# Patient Record
Sex: MALE | Race: WHITE | Employment: FULL TIME | URBAN - METROPOLITAN AREA
[De-identification: names, ages, dates, MRNs, and addresses within clinical notes are randomized per-mention and may not be internally consistent; named-entity substitution may affect disease eponyms.]

---
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
## 2018-09-03 ENCOUNTER — APPOINTMENT (OUTPATIENT)
Dept: GENERAL RADIOLOGY | Age: 18
End: 2018-09-03

## 2018-09-03 ENCOUNTER — HOSPITAL ENCOUNTER (EMERGENCY)
Age: 18
Discharge: HOME | End: 2018-09-03
Attending: EMERGENCY MEDICINE

## 2018-09-03 VITALS
HEART RATE: 60 BPM | BODY MASS INDEX: 23.1 KG/M2 | TEMPERATURE: 98.4 F | RESPIRATION RATE: 16 BRPM | HEIGHT: 71 IN | OXYGEN SATURATION: 100 % | DIASTOLIC BLOOD PRESSURE: 61 MMHG | WEIGHT: 165 LBS | SYSTOLIC BLOOD PRESSURE: 142 MMHG

## 2018-09-03 DIAGNOSIS — S62.315A DISPLACED FRACTURE OF BASE OF FOURTH METACARPAL BONE, LEFT HAND, INITIAL ENCOUNTER FOR CLOSED FRACTURE: ICD-10-CM

## 2018-09-03 DIAGNOSIS — S62.317A DISPLACED FRACTURE OF BASE OF FIFTH METACARPAL BONE, LEFT HAND, INITIAL ENCOUNTER FOR CLOSED FRACTURE: Primary | ICD-10-CM

## 2018-09-03 PROCEDURE — 4500000023 HC ED LEVEL 3 PROCEDURE

## 2018-09-03 PROCEDURE — 6370000000 HC RX 637 (ALT 250 FOR IP): Performed by: EMERGENCY MEDICINE

## 2018-09-03 PROCEDURE — 6360000002 HC RX W HCPCS: Performed by: EMERGENCY MEDICINE

## 2018-09-03 PROCEDURE — 99283 EMERGENCY DEPT VISIT LOW MDM: CPT

## 2018-09-03 PROCEDURE — 73120 X-RAY EXAM OF HAND: CPT

## 2018-09-03 RX ORDER — HYDROCODONE BITARTRATE AND ACETAMINOPHEN 5; 325 MG/1; MG/1
2 TABLET ORAL ONCE
Status: COMPLETED | OUTPATIENT
Start: 2018-09-03 | End: 2018-09-03

## 2018-09-03 RX ORDER — ONDANSETRON 4 MG/1
4 TABLET, ORALLY DISINTEGRATING ORAL ONCE
Status: COMPLETED | OUTPATIENT
Start: 2018-09-03 | End: 2018-09-03

## 2018-09-03 RX ADMIN — HYDROCODONE BITARTRATE AND ACETAMINOPHEN 2 TABLET: 5; 325 TABLET ORAL at 22:00

## 2018-09-03 RX ADMIN — ONDANSETRON 4 MG: 4 TABLET, ORALLY DISINTEGRATING ORAL at 22:00

## 2018-09-03 ASSESSMENT — PAIN DESCRIPTION - ORIENTATION: ORIENTATION: LEFT

## 2018-09-03 ASSESSMENT — PAIN DESCRIPTION - DESCRIPTORS: DESCRIPTORS: ACHING

## 2018-09-03 ASSESSMENT — PAIN DESCRIPTION - PAIN TYPE: TYPE: ACUTE PAIN

## 2018-09-03 ASSESSMENT — PAIN SCALES - GENERAL
PAINLEVEL_OUTOF10: 2
PAINLEVEL_OUTOF10: 2

## 2018-09-03 ASSESSMENT — PAIN DESCRIPTION - LOCATION: LOCATION: HAND
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
## 2018-09-11 ENCOUNTER — CLINICAL DOCUMENTATION (OUTPATIENT)
Dept: ORTHOPEDIC SURGERY | Age: 18
End: 2018-09-11

## 2018-09-13 ENCOUNTER — OFFICE VISIT (OUTPATIENT)
Dept: ORTHOPEDIC SURGERY | Age: 18
End: 2018-09-13

## 2018-09-13 VITALS — BODY MASS INDEX: 23.99 KG/M2 | WEIGHT: 162 LBS | HEIGHT: 69 IN

## 2018-09-13 DIAGNOSIS — S62.337A CLOSED DISPLACED FRACTURE OF NECK OF FIFTH METACARPAL BONE OF LEFT HAND, INITIAL ENCOUNTER: Primary | ICD-10-CM

## 2018-09-13 PROCEDURE — L3807 WHFO W/O JOINTS PRE CST: HCPCS | Performed by: ORTHOPAEDIC SURGERY

## 2018-09-13 PROCEDURE — 99203 OFFICE O/P NEW LOW 30 MIN: CPT | Performed by: ORTHOPAEDIC SURGERY

## 2018-09-13 NOTE — PROGRESS NOTES
intact. Palpation: He has mild tenderness to palpation over the 5th metacarpal, no tenderness to palpation over the 4th metacarpal, negative Tenderness at radial styloid, scaphoid, ulnar styloid, DRUJ    Range of Motion:  He is able to make a complete fist, lacks a few degrees of extension at his small finger. He has 4+ out of 5 strength with the left hand and no pain. Her Gait is normal there is no evidence of scissoring. Skin: There are no rashes, ulcerations or lesions. Special Tests:   Deferred    Reflex: normal    Additional Examinations:  Right Lower Extremity: Examination of the right lower extremity does not show any tenderness, deformity or injury. Range of motion is unremarkable. There is no gross instability. There are no rashes, ulcerations or lesions. Strength and tone are normal.    Cervical spine: The skin is warm and dry. There is no swelling, warmth, or erythema. Range of motion is within normal limits. There is no paraspinal or spinous process tenderness. Spurling's sign is negative and did not produce shoulder pain. The distal neurovascular exam is grossly intact. Additional Comments: Sensation is intact to light touch throughout the median, ulnar and radial nerve distribution. Able to wiggle fingers, give thumbs up, A-OK and cross index and middle fingers. The patient has warm and well-perfused bilateral upper extremities with brisk capillary refill. Minors test is normal    X-RAYS:  3 views of the left hand are reviewed. There is no periosteal reaction, medullary lesions, or osteopenia. Joint spaces are well maintained. He has a vorally angulated 5th metacarpal neck fracture, well-healed 4th metacarpal base fracture no evidence of soft tissue swelling. Assessment:  5th metacarpal neck fracture    Impression:  Encounter Diagnosis   Name Primary?     Closed displaced fracture of neck of fifth metacarpal bone of left hand, initial encounter Yes         Treatment Plan: We discussed treatment options for his 5th metacarpal neck fracture, he does have volar angulation fell any malunion or nonunion. We discussed both surgical and conservative treatment options. Patient states he does not wish to pursue surgery at this time, he will proceed with conservative treatment. Follow up in 1 month for repeat x-ray of the left hand. We placed him in an ulnar gutter boxer's fracture exos splint. He is to wear this at all times aside from bathing. He can return to work in the splint. Office Procedures:  Orders Placed This Encounter   Procedures    Exos Boxers Fracture Brace     Patient was prescribed a Exos Boxer Fracture Brace. The left WRIST will require stabilization / immobilization from this semi-rigid / rigid orthosis to improve their function. The orthosis will assist in protecting the affected area, provide functional support and facilitate healing. The orthosis used a heating element to mold and shape the brace to provide a customizable fit for the patient. The patient was educated and fit by a healthcare professional with expert knowledge and specialization in brace application while under the direct supervision of the treating physician. Verbal and written instructions for the use of and application of this item were provided. They were instructed to contact the office immediately should the brace result in increased pain, decreased sensation, increased swelling or worsening of the condition. Procedures    Exos Boxers Fracture Brace     Patient was prescribed a Exos Boxer Fracture Brace. The left WRIST will require stabilization / immobilization from this semi-rigid / rigid orthosis to improve their function. The orthosis will assist in protecting the affected area, provide functional support and facilitate healing. The orthosis used a heating element to mold and shape the brace to provide a customizable fit for the patient.     The patient was educated and fit by a healthcare professional with expert knowledge and specialization in brace application while under the direct supervision of the treating physician. Verbal and written instructions for the use of and application of this item were provided. They were instructed to contact the office immediately should the brace result in increased pain, decreased sensation, increased swelling or worsening of the condition. Adrien Wise      This dictation was performed with a verbal recognition program (DRAGON) and it was checked for errors. It is possible that there are still dictated errors within this office note. If so, please bring any errors to my attention for an addendum. All efforts were made to ensure that this office note is accurate.

## 2018-09-20 ENCOUNTER — TELEPHONE (OUTPATIENT)
Dept: ORTHOPEDIC SURGERY | Age: 18
End: 2018-09-20

## 2021-10-07 ENCOUNTER — HOSPITAL ENCOUNTER (EMERGENCY)
Age: 21
Discharge: HOME | End: 2021-10-07
Attending: EMERGENCY MEDICINE

## 2021-10-07 VITALS
BODY MASS INDEX: 26.52 KG/M2 | TEMPERATURE: 97.6 F | RESPIRATION RATE: 18 BRPM | SYSTOLIC BLOOD PRESSURE: 121 MMHG | HEART RATE: 56 BPM | DIASTOLIC BLOOD PRESSURE: 79 MMHG | HEIGHT: 68 IN | WEIGHT: 175 LBS | OXYGEN SATURATION: 100 %

## 2021-10-07 DIAGNOSIS — Z53.21 PATIENT LEFT AFTER TRIAGE: Primary | ICD-10-CM

## 2021-10-08 NOTE — ED PROVIDER NOTES
I was happy to see the patient. I went in to see another patient quickly and saw him walking to the restroom and at that time he was well-appearing and in no acute distress with a steady gait. I did not actually speak to the patient although I did trying to tell him that I would be seeing him next but I do not believe he heard me since he just kept walking, but I was assuming he would return to his room after using the restroom. He reportedly told the nurse that he was feeling better and wanted to just follow-up at the dentist while I was seeing another patient. Therefore, I never actually had a conversation with the patient or evaluated the patient since he left before I was made aware of this. If he changes his mind or pain returns, we are always happy to see the patient.     Pression: Left after nurse triage     Suzi Gould MD  10/07/21 9199
normal...

## 2022-10-02 ENCOUNTER — HOSPITAL ENCOUNTER (EMERGENCY)
Age: 22
Discharge: HOME | End: 2022-10-02
Payer: COMMERCIAL

## 2022-10-02 VITALS
OXYGEN SATURATION: 100 % | HEIGHT: 68 IN | TEMPERATURE: 98.3 F | HEART RATE: 57 BPM | BODY MASS INDEX: 26.52 KG/M2 | RESPIRATION RATE: 16 BRPM | SYSTOLIC BLOOD PRESSURE: 116 MMHG | DIASTOLIC BLOOD PRESSURE: 61 MMHG | WEIGHT: 175 LBS

## 2022-10-02 DIAGNOSIS — T15.01XA CORNEAL FOREIGN BODY WITH RESIDUAL MATERIAL, RIGHT, INITIAL ENCOUNTER: Primary | ICD-10-CM

## 2022-10-02 PROCEDURE — 6360000002 HC RX W HCPCS: Performed by: PHYSICIAN ASSISTANT

## 2022-10-02 PROCEDURE — 6370000000 HC RX 637 (ALT 250 FOR IP): Performed by: PHYSICIAN ASSISTANT

## 2022-10-02 PROCEDURE — 90715 TDAP VACCINE 7 YRS/> IM: CPT | Performed by: PHYSICIAN ASSISTANT

## 2022-10-02 PROCEDURE — 99284 EMERGENCY DEPT VISIT MOD MDM: CPT

## 2022-10-02 PROCEDURE — 65220 REMOVE FOREIGN BODY FROM EYE: CPT

## 2022-10-02 PROCEDURE — 90471 IMMUNIZATION ADMIN: CPT | Performed by: PHYSICIAN ASSISTANT

## 2022-10-02 RX ORDER — TOBRAMYCIN 3 MG/ML
1 SOLUTION/ DROPS OPHTHALMIC ONCE
Status: COMPLETED | OUTPATIENT
Start: 2022-10-02 | End: 2022-10-02

## 2022-10-02 RX ADMIN — TOBRAMYCIN OPHTHALMIC SOLUTION 1 DROP: 3 SOLUTION/ DROPS OPHTHALMIC at 13:58

## 2022-10-02 RX ADMIN — TETANUS TOXOID, REDUCED DIPHTHERIA TOXOID AND ACELLULAR PERTUSSIS VACCINE, ADSORBED 0.5 ML: 5; 2.5; 8; 8; 2.5 SUSPENSION INTRAMUSCULAR at 13:58

## 2022-10-02 ASSESSMENT — ENCOUNTER SYMPTOMS
VOMITING: 0
EYE WATERING: 1
EYE DISCHARGE: 0
DOUBLE VISION: 0

## 2022-10-02 ASSESSMENT — PAIN - FUNCTIONAL ASSESSMENT: PAIN_FUNCTIONAL_ASSESSMENT: NONE - DENIES PAIN

## 2022-10-02 ASSESSMENT — VISUAL ACUITY
OS: 20/20
OU: 1
OU: 20/20
OD: 20/30

## 2022-10-02 NOTE — ED PROVIDER NOTES
[facility]        Pt Name: Jadyn Arreguin  MRN: [id number]  [place] 2000  Date of evaluation: 10/2/2022  Provider: Kaley Aceves  PCP: None Provider    Shared Visit or Autonomous Visit: KYLE. I have evaluated this patient. My supervising physician was available for consultation. CHIEF COMPLAINT       Chief Complaint   Patient presents with    Foreign Body in Eye     Pt states he was grinding a bumper 2 days ago and believes he has metal shavings in his R eye       HISTORY OF PRESENT ILLNESS   (Location/Symptom, Timing/Onset, Context/Setting, Quality, Duration, Modifying Factors, Severity)  Note limiting factors. Jadyn Arreguin is a 23 y.o. male presenting to the emergency department for evaluation of foreign body right eye he was cutting his bumper 2 days ago and believes he got metal shaving in his right eye. States not having much pain but is irritating and every time he closes his eyelid feels like its being scratched up under his eyelid. Light sensitive. Slight blurry vision. No loss of vision. Unsure of last tetanus. The history is provided by the patient. Eye Problem  Location:  Right eye  Quality:  Foreign body sensation (irritating)  Onset quality:  Sudden  Duration:  2 days  Timing:  Constant  Progression:  Unchanged  Chronicity:  New  Context: foreign body    Context: not chemical exposure and not contact lens problem    Associated symptoms: tearing    Associated symptoms: no discharge, no double vision, no facial rash and no vomiting      Nursing Notes were reviewed    REVIEW OF SYSTEMS    (2-9 systems for level 4, 10 or more for level 5)     Review of Systems   Constitutional:  Negative for fever. Eyes:  Negative for double vision and discharge. Right eye irritation and foreign body sensation   Gastrointestinal:  Negative for vomiting. Skin:  Negative for rash.      Positives and Pertinent negatives as per HPI. PAST MEDICAL HISTORY     Past Medical History:   Diagnosis Date    Hirschsprung's disease          SURGICAL HISTORY   History reviewed. No pertinent surgical history. CURRENTMEDICATIONS       There are no discharge medications for this patient. ALLERGIES     Patient has no known allergies. FAMILYHISTORY     History reviewed. No pertinent family history. SOCIAL HISTORY       Social History     Socioeconomic History    Marital status: Single     Spouse name: None    Number of children: None    Years of education: None    Highest education level: None   Tobacco Use    Smoking status: Never    Smokeless tobacco: Current     Types: Chew   Vaping Use    Vaping Use: Never used   Substance and Sexual Activity    Alcohol use: Yes     Comment: rare    Drug use: No       SCREENINGS    [place] Coma Scale  Eye Opening: Spontaneous  Best Verbal Response: Oriented  Best Motor Response: Obeys commands  [place] Coma Scale Score: 15        PHYSICAL EXAM    (up to 7 for level 4, 8 or more for level 5)     ED Triage Vitals [10/02/22 1320]   BP Temp Temp Source Heart Rate Resp SpO2 Height Weight   116/61 98.3 °F (36.8 °C) Oral 57 16 100 % 5' 8\" (1.727 m) 175 lb (79.4 kg)       Physical Exam  Vitals and nursing note reviewed. Constitutional:       Appearance: He is well-developed. He is not ill-appearing or toxic-appearing. HENT:      Head: Normocephalic and atraumatic. Eyes:      General: Lids are normal. Lids are everted, no foreign bodies appreciated. Vision grossly intact. Gaze aligned appropriately. Right eye: No foreign body, discharge or hordeolum. Extraocular Movements: Extraocular movements intact. Conjunctiva/sclera:      Right eye: Right conjunctiva is injected. No chemosis or hemorrhage. Pupils: Pupils are equal, round, and reactive to light. Right eye: Daniel exam negative.       Comments: Tiny metallic foreign body right cornea central and medially at 3 o'clock position with fluorescein uptake just surrounding FB. Negative quan sign. PERRL. Normal EOMs. No hemorrhage. No chemosis or hyphema. Pulmonary:      Effort: Pulmonary effort is normal.   Skin:     General: Skin is warm and dry. Neurological:      Mental Status: He is alert and oriented to person, place, and time. Motor: No abnormal muscle tone. Psychiatric:         Behavior: Behavior normal.       DIAGNOSTIC RESULTS   LABS:    Labs Reviewed - No data to display    No results found for this visit on 10/02/22. All other labs were not returned as of this dictation. EKG: All EKG's are interpreted by the Emergency Department Physician in the absence of a cardiologist.  Please see their note for interpretation of EKG. RADIOLOGY:   Non-plain film images such as CT, Ultrasound and MRI are read by the radiologist. Plain radiographic images are visualized andpreliminarily interpreted by the  ED Provider with the below findings:      Interpretation perthe Radiologist below, if available at the time of this note:    No orders to display     No results found. PROCEDURES   Unless otherwise noted below, none     Foreign Body Occular    Date/Time: 10/2/2022 1:50 PM  Performed by: Vernell Marie PA-C  Authorized by: Vernell Marie PA-C     Consent:     Consent obtained:  Verbal    Consent given by:  Patient    Risks, benefits, and alternatives were discussed: yes      Risks discussed:  Pain, incomplete removal, infection, worsening of condition and corneal damage  Universal protocol:     Procedure explained and questions answered to patient or proxy's satisfaction: yes      Patient identity confirmed:  Verbally with patient  Location:     Location:  R corneal    Depth:  Superficial  Pre-procedure details:     Fluorescein exam: yes      Fluorescein uptake: yes      Quan test: negative      Corneal abrasion description:   At [facility] site only    Corneal abrasion location: Central  Anesthesia:     Local anesthetic:  Tetracaine drops  Procedure details:     Localization method:  Direct visualization, Wood's lamp, fluorescein and eyelid eversion    Removal mechanism:  Eye spud and moist cotton swab    Foreign bodies recovered:  1    Description:  1mm metallic FB    Intact foreign body removal: no    Post-procedure details:     Confirmation:  Residual foreign bodies remain    Dressing:  Antibiotic drops    Procedure completion:  Tolerated well, no immediate complications    CRITICAL CARE TIME   Critical care provided for this patient of which 0 min were spend on critical care and decision making. 0 min spent on procedures. There was imminent failure of an organ system which required critical intervention to prevent clinically significant progression of life threatening deterioration of the patient's condition to the point of disability or death. CONSULTS:  None      EMERGENCY DEPARTMENT COURSE and DIFFERENTIAL DIAGNOSIS/MDM:   Vitals:    Vitals:    10/02/22 1320   BP: 116/61   Pulse: 57   Resp: 16   Temp: 98.3 °F (36.8 °C)   TempSrc: Oral   SpO2: 100%   Weight: 175 lb (79.4 kg)   Height: 5' 8\" (1.727 m)       Patient was given thefollowing medications:  Medications   Tetanus-Diphth-Acell Pertussis (BOOSTRIX) injection 0.5 mL (0.5 mLs IntraMUSCular Given 10/2/22 1358)   tobramycin (TOBREX) 0.3 % ophthalmic solution 1 drop (1 drop Right Eye Given 10/2/22 1358)       Is this patient to be included in the SEP-1 Core Measure due to severe sepsis or septic shock? No   Exclusion criteria - the patient is NOT to be included for SEP-1 Core Measure due to: Infection is not suspected       ED course  Patient presented to the ER for evaluation of foreign body right eye after cutting metal 2 days ago. On exam he has small metallic foreign body right central medial cornea 3 o'clock position. Visual acuity 20/30 right eye, 20/20 left eye. Pain relieved with tetracaine drop here.   Slight fluorescein uptake just surrounding the foreign body. Negative Daniel sign. No signs of globe rupture. PERRL. Normal EOMs. Tetracaine drop was instilled. Wetted Q-tip was used removing greater than 50% of foreign body but residual foreign body remaining. Spud was then used with several attempts but unable to remove remaining foreign body. Recommend ophthalmology follow-up for removal of residual foreign matter. Patient in agreement. Tetanus updated and he was placed on antibiotic eyedrops. To see [address] tomorrow. Return for any worsening. He understands and agrees. I estimate there is LOW risk for CORNEAL ULCER, PENETRATING GLOBE INJURY, CENTRAL RETINAL ARTERY OCCLUSION, ACUTE GLAUCOMA, RETINAL VEIN THROMBOSIS, OR HERPETIC KERATITIS, thus I consider the discharge disposition reasonable. FINAL IMPRESSION      1. Corneal foreign body with residual material, right, initial encounter          DISPOSITION/PLAN   DISPOSITION Decision to Discharge    PATIENT REFERREDTO:  [facility] [address]    In 1 day      [place] [facility] Emergency Department  [address] [phone]    If symptoms worsen      DISCHARGE MEDICATIONS:  There are no discharge medications for this patient. DISCONTINUED MEDICATIONS:  There are no discharge medications for this patient.              (Please note that portions ofthis note were completed with a voice recognition program.  Efforts were made to edit the dictations but occasionally words are mis-transcribed.)    Jamel Sheppard PA-C (electronically signed)           Pardeep Carlin PA-C  10/02/22 4833

## 2023-11-28 ENCOUNTER — APPOINTMENT (OUTPATIENT)
Dept: GENERAL RADIOLOGY | Age: 23
End: 2023-11-28
Payer: COMMERCIAL

## 2023-11-28 ENCOUNTER — HOSPITAL ENCOUNTER (EMERGENCY)
Age: 23
Discharge: HOME | End: 2023-11-28
Attending: EMERGENCY MEDICINE
Payer: COMMERCIAL

## 2023-11-28 VITALS
HEIGHT: 69 IN | WEIGHT: 182.8 LBS | HEART RATE: 67 BPM | SYSTOLIC BLOOD PRESSURE: 124 MMHG | TEMPERATURE: 97.6 F | RESPIRATION RATE: 18 BRPM | BODY MASS INDEX: 27.08 KG/M2 | DIASTOLIC BLOOD PRESSURE: 65 MMHG | OXYGEN SATURATION: 100 %

## 2023-11-28 DIAGNOSIS — S92.511A: Primary | ICD-10-CM

## 2023-11-28 PROCEDURE — 99283 EMERGENCY DEPT VISIT LOW MDM: CPT

## 2023-11-28 PROCEDURE — 73630 X-RAY EXAM OF FOOT: CPT

## 2023-11-28 PROCEDURE — 29515 APPLICATION SHORT LEG SPLINT: CPT

## 2023-11-28 ASSESSMENT — PAIN DESCRIPTION - DESCRIPTORS: DESCRIPTORS: ACHING

## 2023-11-28 ASSESSMENT — PAIN SCALES - GENERAL: PAINLEVEL_OUTOF10: 3

## 2023-11-28 ASSESSMENT — PAIN DESCRIPTION - LOCATION: LOCATION: FOOT

## 2023-11-28 ASSESSMENT — PAIN DESCRIPTION - PAIN TYPE: TYPE: ACUTE PAIN

## 2023-11-28 ASSESSMENT — PAIN - FUNCTIONAL ASSESSMENT: PAIN_FUNCTIONAL_ASSESSMENT: 0-10

## 2023-11-28 ASSESSMENT — PAIN DESCRIPTION - ORIENTATION: ORIENTATION: RIGHT

## 2023-11-28 NOTE — ED NOTES
Applied a walking boot on patients right foot. Placed the patient into crutches. Patient understood how to use both.       [facility]  [id number]/91/67 [id number]

## 2023-11-28 NOTE — ED TRIAGE NOTES
Chief Complaint   Patient presents with    Foot Injury     Dropped heavy metal pole on right foot 6 days ago, states dropped a chain binder on the same spot today, c/o pain and swelling

## 2023-11-28 NOTE — ED PROVIDER NOTES
[facility] Emergency Department      CHIEF COMPLAINT  Foot Injury (Dropped heavy metal pole on right foot 6 days ago, states dropped a chain binder on the same spot today, c/o pain and swelling)      HISTORY OF PRESENT ILLNESS  Malou Zarco is a 20 y.o. male presents with right foot pain. He states 6 days ago he dropped a heavy metal pole on his foot. He then dropped a chain binder on the foot today. He has had swelling and bruising at the base of his toes. He has been walking on it. He finally decided to come in today to get it evaluated. .   No other complaints, modifying factors or associated symptoms. History obtained from the patient. I have reviewed the following from the nursing documentation. Past Medical History:   Diagnosis Date    Hirschsprung's disease      History reviewed. No pertinent surgical history. History reviewed. No pertinent family history. Social History     Socioeconomic History    Marital status: Single     Spouse name: Not on file    Number of children: Not on file    Years of education: Not on file    Highest education level: Not on file   Occupational History    Not on file   Tobacco Use    Smoking status: Never    Smokeless tobacco: Former     Types: Chew   Vaping Use    Vaping Use: Never used   Substance and Sexual Activity    Alcohol use: Yes     Comment: occ    Drug use: No    Sexual activity: Not on file   Other Topics Concern    Not on file   Social History Narrative    Not on file     Social Determinants of Health     Financial Resource Strain: Not on file   Food Insecurity: Not on file   Transportation Needs: Not on file   Physical Activity: Not on file   Stress: Not on file   Social Connections: Not on file   Intimate Partner Violence: Not on file   Housing Stability: Not on file     No current facility-administered medications for this encounter. No current outpatient medications on file.      No Known Allergies    REVIEW OF

## 2023-11-28 NOTE — DISCHARGE INSTRUCTIONS
You have a fracture at the base of your fifth pinky toe on your right foot. You have been given a postop boot to wear. I would like you to use the crutches so you do not bear complete weight on this foot. Elevate the leg as much as possible. Follow-up with your primary doctor within the week. Alternate Tylenol and Motrin for pain.